# Patient Record
Sex: FEMALE | Race: WHITE | NOT HISPANIC OR LATINO | Employment: PART TIME | URBAN - METROPOLITAN AREA
[De-identification: names, ages, dates, MRNs, and addresses within clinical notes are randomized per-mention and may not be internally consistent; named-entity substitution may affect disease eponyms.]

---

## 2024-07-11 ENCOUNTER — OFFICE VISIT (OUTPATIENT)
Dept: OBSTETRICS AND GYNECOLOGY | Facility: CLINIC | Age: 29
End: 2024-07-11

## 2024-07-11 VITALS
DIASTOLIC BLOOD PRESSURE: 72 MMHG | SYSTOLIC BLOOD PRESSURE: 100 MMHG | WEIGHT: 105 LBS | BODY MASS INDEX: 18.61 KG/M2 | HEIGHT: 63 IN

## 2024-07-11 DIAGNOSIS — N76.0 ACUTE VAGINITIS: Primary | ICD-10-CM

## 2024-07-11 DIAGNOSIS — L73.1 INGROWN HAIR: ICD-10-CM

## 2024-07-11 PROCEDURE — 1036F TOBACCO NON-USER: CPT | Performed by: OBSTETRICS & GYNECOLOGY

## 2024-07-11 PROCEDURE — 87491 CHLMYD TRACH DNA AMP PROBE: CPT | Mod: WESLAB | Performed by: OBSTETRICS & GYNECOLOGY

## 2024-07-11 PROCEDURE — 87205 SMEAR GRAM STAIN: CPT | Mod: WESLAB | Performed by: OBSTETRICS & GYNECOLOGY

## 2024-07-11 PROCEDURE — 99203 OFFICE O/P NEW LOW 30 MIN: CPT | Performed by: OBSTETRICS & GYNECOLOGY

## 2024-07-11 PROCEDURE — 99213 OFFICE O/P EST LOW 20 MIN: CPT | Performed by: OBSTETRICS & GYNECOLOGY

## 2024-07-11 RX ORDER — HYDROCORTISONE 10 MG/ML
LOTION TOPICAL 2 TIMES DAILY
Qty: 96 G | Refills: 0 | Status: SHIPPED | OUTPATIENT
Start: 2024-07-11 | End: 2024-08-10

## 2024-07-11 RX ORDER — SUMATRIPTAN SUCCINATE 25 MG/1
25 TABLET ORAL ONCE AS NEEDED
COMMUNITY

## 2024-07-11 RX ORDER — AMITRIPTYLINE HYDROCHLORIDE 10 MG/1
TABLET, FILM COATED ORAL
COMMUNITY

## 2024-07-11 RX ORDER — METRONIDAZOLE 500 MG/1
500 TABLET ORAL 2 TIMES DAILY
Qty: 14 TABLET | Refills: 0 | Status: SHIPPED | OUTPATIENT
Start: 2024-07-11 | End: 2024-07-18

## 2024-07-11 ASSESSMENT — PATIENT HEALTH QUESTIONNAIRE - PHQ9
1. LITTLE INTEREST OR PLEASURE IN DOING THINGS: NOT AT ALL
SUM OF ALL RESPONSES TO PHQ9 QUESTIONS 1 AND 2: 1
2. FEELING DOWN, DEPRESSED OR HOPELESS: SEVERAL DAYS
10. IF YOU CHECKED OFF ANY PROBLEMS, HOW DIFFICULT HAVE THESE PROBLEMS MADE IT FOR YOU TO DO YOUR WORK, TAKE CARE OF THINGS AT HOME, OR GET ALONG WITH OTHER PEOPLE: SOMEWHAT DIFFICULT

## 2024-07-11 ASSESSMENT — ENCOUNTER SYMPTOMS
DEPRESSION: 0
LOSS OF SENSATION IN FEET: 0
OCCASIONAL FEELINGS OF UNSTEADINESS: 0

## 2024-07-11 ASSESSMENT — PAIN SCALES - GENERAL: PAINLEVEL: 0-NO PAIN

## 2024-07-11 NOTE — PROGRESS NOTES
"Cynthia Romeo is a 29 y.o.  who presents for vaginal discharge     Complains:       vaginal discharge             Foul smelling             itching and burning X 3 days.  Ingrowing hair pubis       Menses:   no periods Mirena        History of abnormal pap: No        OB History          0    Para   0    Term   0       0    AB   0    Living   0         SAB   0    IAB   0    Ectopic   0    Multiple   0    Live Births   0               Past Medical History:   Diagnosis Date    Migraine      History reviewed. No pertinent surgical history.  No family history on file.  Social History     Tobacco Use    Smoking status: Never    Smokeless tobacco: Never   Vaping Use    Vaping status: Every Day   Substance Use Topics    Alcohol use: Not Currently    Drug use: Never           REVIEW OF SYSTEMS  Abdomen: No abdominal pain, nausea, vomiting, diarrhea, or constipation.   No bloating, early satiety, indigestion, or increased flatulence.  Bladder: No dysuria, gross hematuria, urinary frequency, urinary urgency, or incontinence.  Breast: No breast lumps, nipple d/c, overlying skin changes, redness or skin retraction.  Allergies and current medication updated:Yes        EXAM:  /72   Ht 1.588 m (5' 2.5\")   Wt 47.6 kg (105 lb)   BMI 18.90 kg/m²   GENERAL: pleasant, female in no apparent distress  HEENT: Normocephalic, atraumatic, mucus membranes moist and no lesions  NECK: Supple, full range of motion, no adenopathy and thyroid normal  DERMATOLOGY: Normal, without lesions, non-icteric and non-hirsute     CHEST: Normal inspiratory effort  ABDOMEN: soft, non-tender and no masses  PELVIC:  irritated external genitalia, normal vaginal introitus and urethra, labia - unremarkable, cervix - white discharge . No lesions or CMT, adnexa - no masses or tenderness, uterus - nontender and normal size on palpable   Ingrowing hair      NEURO: alert and oriented x3,exam grossly non-focal  EXTREMITIES: normal      "   ASSESSMENT:  Vaginitis   Ingrowing hair      PLAN:  Swabs done   Discussed vulvar hygeine  Avoid soap and detergent  Cotton underwear  Routine Health Maintenance through patient PCP  Follow up one year and as needed.  .     Anne Montez MD           This note was produced with voice recognition software and may contain errors in grammar, spelling and content.  If any questions, please feel free to contact me.     I was accompanied by Female Chaperone, LPN  during the exam

## 2024-07-12 LAB
C TRACH RRNA SPEC QL NAA+PROBE: NEGATIVE
CLUE CELLS VAG LPF-#/AREA: ABNORMAL /[LPF]
N GONORRHOEA DNA SPEC QL PROBE+SIG AMP: NEGATIVE
NUGENT SCORE: 1
YEAST VAG WET PREP-#/AREA: PRESENT

## 2024-11-19 ENCOUNTER — APPOINTMENT (OUTPATIENT)
Dept: CARDIOLOGY | Facility: HOSPITAL | Age: 29
End: 2024-11-19
Payer: COMMERCIAL

## 2024-11-19 ENCOUNTER — HOSPITAL ENCOUNTER (EMERGENCY)
Facility: HOSPITAL | Age: 29
Discharge: HOME | End: 2024-11-19
Payer: COMMERCIAL

## 2024-11-19 VITALS
WEIGHT: 112 LBS | DIASTOLIC BLOOD PRESSURE: 90 MMHG | RESPIRATION RATE: 18 BRPM | HEIGHT: 62 IN | OXYGEN SATURATION: 100 % | BODY MASS INDEX: 20.61 KG/M2 | SYSTOLIC BLOOD PRESSURE: 114 MMHG | HEART RATE: 90 BPM | TEMPERATURE: 98.1 F

## 2024-11-19 DIAGNOSIS — F41.9 ANXIETY: Primary | ICD-10-CM

## 2024-11-19 LAB
ALBUMIN SERPL BCP-MCNC: 4.7 G/DL (ref 3.4–5)
ALP SERPL-CCNC: 70 U/L (ref 33–110)
ALT SERPL W P-5'-P-CCNC: 19 U/L (ref 7–45)
AMPHETAMINES UR QL SCN: NORMAL
ANION GAP SERPL CALCULATED.3IONS-SCNC: 11 MMOL/L (ref 10–20)
APAP SERPL-MCNC: <10 UG/ML
APPEARANCE UR: CLEAR
AST SERPL W P-5'-P-CCNC: 21 U/L (ref 9–39)
BARBITURATES UR QL SCN: NORMAL
BASOPHILS # BLD AUTO: 0.07 X10*3/UL (ref 0–0.1)
BASOPHILS NFR BLD AUTO: 0.9 %
BENZODIAZ UR QL SCN: NORMAL
BILIRUB SERPL-MCNC: 0.6 MG/DL (ref 0–1.2)
BILIRUB UR STRIP.AUTO-MCNC: NEGATIVE MG/DL
BUN SERPL-MCNC: 11 MG/DL (ref 6–23)
BZE UR QL SCN: NORMAL
CALCIUM SERPL-MCNC: 9.8 MG/DL (ref 8.6–10.3)
CANNABINOIDS UR QL SCN: NORMAL
CHLORIDE SERPL-SCNC: 106 MMOL/L (ref 98–107)
CO2 SERPL-SCNC: 23 MMOL/L (ref 21–32)
COLOR UR: COLORLESS
CREAT SERPL-MCNC: 0.82 MG/DL (ref 0.5–1.05)
EGFRCR SERPLBLD CKD-EPI 2021: >90 ML/MIN/1.73M*2
EOSINOPHIL # BLD AUTO: 0.05 X10*3/UL (ref 0–0.7)
EOSINOPHIL NFR BLD AUTO: 0.6 %
ERYTHROCYTE [DISTWIDTH] IN BLOOD BY AUTOMATED COUNT: 11.9 % (ref 11.5–14.5)
ETHANOL SERPL-MCNC: <10 MG/DL
FENTANYL+NORFENTANYL UR QL SCN: NORMAL
GLUCOSE SERPL-MCNC: 85 MG/DL (ref 74–99)
GLUCOSE UR STRIP.AUTO-MCNC: NORMAL MG/DL
HCT VFR BLD AUTO: 39.8 % (ref 36–46)
HGB BLD-MCNC: 13.5 G/DL (ref 12–16)
IMM GRANULOCYTES # BLD AUTO: 0.02 X10*3/UL (ref 0–0.7)
IMM GRANULOCYTES NFR BLD AUTO: 0.2 % (ref 0–0.9)
KETONES UR STRIP.AUTO-MCNC: NEGATIVE MG/DL
LEUKOCYTE ESTERASE UR QL STRIP.AUTO: NEGATIVE
LYMPHOCYTES # BLD AUTO: 1.51 X10*3/UL (ref 1.2–4.8)
LYMPHOCYTES NFR BLD AUTO: 18.5 %
MAGNESIUM SERPL-MCNC: 1.87 MG/DL (ref 1.6–2.4)
MCH RBC QN AUTO: 28.1 PG (ref 26–34)
MCHC RBC AUTO-ENTMCNC: 33.9 G/DL (ref 32–36)
MCV RBC AUTO: 83 FL (ref 80–100)
METHADONE UR QL SCN: NORMAL
MONOCYTES # BLD AUTO: 0.38 X10*3/UL (ref 0.1–1)
MONOCYTES NFR BLD AUTO: 4.7 %
NEUTROPHILS # BLD AUTO: 6.12 X10*3/UL (ref 1.2–7.7)
NEUTROPHILS NFR BLD AUTO: 75.1 %
NITRITE UR QL STRIP.AUTO: NEGATIVE
NRBC BLD-RTO: 0 /100 WBCS (ref 0–0)
OPIATES UR QL SCN: NORMAL
OXYCODONE+OXYMORPHONE UR QL SCN: NORMAL
PCP UR QL SCN: NORMAL
PH UR STRIP.AUTO: 7.5 [PH]
PLATELET # BLD AUTO: 226 X10*3/UL (ref 150–450)
POTASSIUM SERPL-SCNC: 3.3 MMOL/L (ref 3.5–5.3)
PROT SERPL-MCNC: 7.9 G/DL (ref 6.4–8.2)
PROT UR STRIP.AUTO-MCNC: NEGATIVE MG/DL
RBC # BLD AUTO: 4.8 X10*6/UL (ref 4–5.2)
RBC # UR STRIP.AUTO: NEGATIVE /UL
SALICYLATES SERPL-MCNC: <3 MG/DL
SODIUM SERPL-SCNC: 137 MMOL/L (ref 136–145)
SP GR UR STRIP.AUTO: 1
UROBILINOGEN UR STRIP.AUTO-MCNC: NORMAL MG/DL
WBC # BLD AUTO: 8.2 X10*3/UL (ref 4.4–11.3)

## 2024-11-19 PROCEDURE — 2500000004 HC RX 250 GENERAL PHARMACY W/ HCPCS (ALT 636 FOR OP/ED)

## 2024-11-19 PROCEDURE — 83735 ASSAY OF MAGNESIUM: CPT

## 2024-11-19 PROCEDURE — 93005 ELECTROCARDIOGRAM TRACING: CPT

## 2024-11-19 PROCEDURE — 81003 URINALYSIS AUTO W/O SCOPE: CPT

## 2024-11-19 PROCEDURE — 90839 PSYTX CRISIS INITIAL 60 MIN: CPT

## 2024-11-19 PROCEDURE — 99284 EMERGENCY DEPT VISIT MOD MDM: CPT | Mod: 25

## 2024-11-19 PROCEDURE — 80307 DRUG TEST PRSMV CHEM ANLYZR: CPT

## 2024-11-19 PROCEDURE — 96374 THER/PROPH/DIAG INJ IV PUSH: CPT

## 2024-11-19 PROCEDURE — 96375 TX/PRO/DX INJ NEW DRUG ADDON: CPT

## 2024-11-19 PROCEDURE — 80053 COMPREHEN METABOLIC PANEL: CPT

## 2024-11-19 PROCEDURE — 2500000001 HC RX 250 WO HCPCS SELF ADMINISTERED DRUGS (ALT 637 FOR MEDICARE OP)

## 2024-11-19 PROCEDURE — 36415 COLL VENOUS BLD VENIPUNCTURE: CPT

## 2024-11-19 PROCEDURE — 85025 COMPLETE CBC W/AUTO DIFF WBC: CPT

## 2024-11-19 PROCEDURE — 80320 DRUG SCREEN QUANTALCOHOLS: CPT

## 2024-11-19 RX ORDER — PROCHLORPERAZINE EDISYLATE 5 MG/ML
5 INJECTION INTRAMUSCULAR; INTRAVENOUS ONCE
Status: COMPLETED | OUTPATIENT
Start: 2024-11-19 | End: 2024-11-19

## 2024-11-19 RX ORDER — ACETAMINOPHEN 325 MG/1
975 TABLET ORAL ONCE
Status: COMPLETED | OUTPATIENT
Start: 2024-11-19 | End: 2024-11-19

## 2024-11-19 RX ORDER — DIPHENHYDRAMINE HYDROCHLORIDE 50 MG/ML
12.5 INJECTION INTRAMUSCULAR; INTRAVENOUS ONCE
Status: COMPLETED | OUTPATIENT
Start: 2024-11-19 | End: 2024-11-19

## 2024-11-19 RX ADMIN — PROCHLORPERAZINE EDISYLATE 5 MG: 5 INJECTION INTRAMUSCULAR; INTRAVENOUS at 16:37

## 2024-11-19 RX ADMIN — DIPHENHYDRAMINE HYDROCHLORIDE 12.5 MG: 50 INJECTION, SOLUTION INTRAMUSCULAR; INTRAVENOUS at 16:37

## 2024-11-19 RX ADMIN — ACETAMINOPHEN 975 MG: 325 TABLET, FILM COATED ORAL at 16:37

## 2024-11-19 SDOH — HEALTH STABILITY: MENTAL HEALTH: HAVE YOU EVER TRIED TO KILL YOURSELF?: NO

## 2024-11-19 SDOH — HEALTH STABILITY: MENTAL HEALTH: ANXIETY SYMPTOMS: GENERALIZED;PANIC ATTACK

## 2024-11-19 SDOH — HEALTH STABILITY: MENTAL HEALTH: IN THE PAST FEW WEEKS, HAVE YOU WISHED YOU WERE DEAD?: NO

## 2024-11-19 SDOH — HEALTH STABILITY: MENTAL HEALTH: WISH TO BE DEAD (PAST 1 MONTH): NO

## 2024-11-19 SDOH — HEALTH STABILITY: MENTAL HEALTH: NON-SPECIFIC ACTIVE SUICIDAL THOUGHTS (PAST 1 MONTH): NO

## 2024-11-19 SDOH — ECONOMIC STABILITY: HOUSING INSECURITY: FEELS SAFE LIVING IN HOME: YES

## 2024-11-19 SDOH — HEALTH STABILITY: MENTAL HEALTH: IN THE PAST FEW WEEKS, HAVE YOU FELT THAT YOU OR YOUR FAMILY WOULD BE BETTER OFF IF YOU WERE DEAD?: NO

## 2024-11-19 SDOH — HEALTH STABILITY: MENTAL HEALTH: SUICIDAL BEHAVIOR (LIFETIME): NO

## 2024-11-19 SDOH — HEALTH STABILITY: MENTAL HEALTH: IN THE PAST WEEK, HAVE YOU BEEN HAVING THOUGHTS ABOUT KILLING YOURSELF?: NO

## 2024-11-19 SDOH — HEALTH STABILITY: MENTAL HEALTH: DEPRESSION SYMPTOMS: NO PROBLEMS REPORTED OR OBSERVED.

## 2024-11-19 SDOH — HEALTH STABILITY: MENTAL HEALTH: ARE YOU HAVING THOUGHTS OF KILLING YOURSELF RIGHT NOW?: NO

## 2024-11-19 SDOH — ECONOMIC STABILITY: GENERAL: FINANCIAL CONCERNS: INSURANCE COVERAGE IS INADEQUATE

## 2024-11-19 ASSESSMENT — LIFESTYLE VARIABLES
PRESCIPTION_ABUSE_PAST_12_MONTHS: NO
SUBSTANCE_ABUSE_PAST_12_MONTHS: NO

## 2024-11-19 ASSESSMENT — PAIN DESCRIPTION - PROGRESSION: CLINICAL_PROGRESSION: NOT CHANGED

## 2024-11-19 ASSESSMENT — PAIN - FUNCTIONAL ASSESSMENT: PAIN_FUNCTIONAL_ASSESSMENT: 0-10

## 2024-11-19 ASSESSMENT — PAIN SCALES - GENERAL: PAINLEVEL_OUTOF10: 0 - NO PAIN

## 2024-11-19 NOTE — Clinical Note
Cynthia Romeo was seen and treated in our emergency department on 11/19/2024.  She may return to work on 11/20/2024.       If you have any questions or concerns, please don't hesitate to call.      Siobhan Joseph PA-C
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
## 2024-11-19 NOTE — ED PROVIDER NOTES
HPI   Chief Complaint   Patient presents with    Panic Attack     Pt bib EMS from side of the road for panic attack, pt pulled over and called EMS for transport. Pt stopped taking medications for anxiety. Vitals stable.       HPI  Patient is a 29-year-old female presenting brought in by EMS after having an alleged anxiety attack.  Patient states that she was driving and started hyperventilating and started having some numbness of her fingers and up her arms specifically with her left arm.  Patient states that this has been happening on and off for the past year and a half or so.  States her symptoms do improve when she tries to perform deep breathing exercises.  She states she did stop taking her anxiety medication because she felt it was making her dizzy.  She has not followed up with a psychiatrist or therapist regarding her symptoms.  She is frustrated and emotional providing history but denies suicidal or homicidal ideation.  Denies chest pain or shortness of breath.  States symptoms are improving upon arrival to the ER.      Patient History   Past Medical History:   Diagnosis Date    Migraine      History reviewed. No pertinent surgical history.  No family history on file.  Social History     Tobacco Use    Smoking status: Never    Smokeless tobacco: Never   Vaping Use    Vaping status: Every Day   Substance Use Topics    Alcohol use: Not Currently    Drug use: Never       Physical Exam   ED Triage Vitals [11/19/24 1548]   Temperature Heart Rate Respirations BP   36.7 °C (98.1 °F) 90 18 114/90      Pulse Ox Temp Source Heart Rate Source Patient Position   100 % Tympanic Monitor --      BP Location FiO2 (%)     Right arm --       Physical Exam  Vitals and nursing note reviewed.   Constitutional:       General: She is not in acute distress.     Appearance: She is well-developed.   HENT:      Head: Normocephalic and atraumatic.   Eyes:      Conjunctiva/sclera: Conjunctivae normal.   Cardiovascular:      Rate and  Rhythm: Normal rate and regular rhythm.      Heart sounds: No murmur heard.  Pulmonary:      Effort: Pulmonary effort is normal. No respiratory distress.      Breath sounds: Normal breath sounds.   Abdominal:      Palpations: Abdomen is soft.      Tenderness: There is no abdominal tenderness.   Musculoskeletal:         General: No swelling.      Cervical back: Neck supple.   Skin:     General: Skin is warm and dry.      Capillary Refill: Capillary refill takes less than 2 seconds.   Neurological:      Mental Status: She is alert.      Comments: Oriented to person place and time.  No focal neurologic deficits.  NIH stroke scale score is 0.   Psychiatric:         Mood and Affect: Mood normal.      Comments: Emotional but otherwise calm and cooperative with examiner           ED Course & MDM   ED Course as of 11/21/24 1633   Tue Nov 19, 2024   1647 ECG 12 lead  Performed at  1630, HR of 66, NSR, NAD, QTc 425, no sign of STEMI, no Q wave or T wave abnormality noted.    Reviewed and interpreted by me at time performed   [JM]   1743 She states after resting she feels much better and symptoms have completely resolved at this time.  Stable for discharge. [JJ]      ED Course User Index  [JJ] Siobhan Joseph PA-C  [] Missy Zuniga MD         Diagnoses as of 11/21/24 1633   Anxiety                 No data recorded     Kori Coma Scale Score: 15 (11/19/24 1553 : Amy Ackerman, SAM)                           Medical Decision Making  Parts of this chart have been completed using voice recognition software. Please excuse any errors of transcription.  My thought process and reason for plan has been formulated from the time that I saw the patient until the time of disposition and is not specific to one specific moment during their visit and furthermore my MDM encompasses this entire chart and not only this text box.      HPI: Detailed above.    Exam: A medically appropriate exam performed, outlined above, given the  known history and presentation.    History obtained from: Patient    Medications given during visit:  Medications   acetaminophen (Tylenol) tablet 975 mg (975 mg oral Given 11/19/24 1637)   prochlorperazine (Compazine) injection 5 mg (5 mg intravenous Given 11/19/24 1637)   diphenhydrAMINE (BENADryl) injection 12.5 mg (12.5 mg intravenous Given 11/19/24 1637)        Diagnostic/tests  Labs Reviewed   COMPREHENSIVE METABOLIC PANEL - Abnormal       Result Value    Glucose 85      Sodium 137      Potassium 3.3 (*)     Chloride 106      Bicarbonate 23      Anion Gap 11      Urea Nitrogen 11      Creatinine 0.82      eGFR >90      Calcium 9.8      Albumin 4.7      Alkaline Phosphatase 70      Total Protein 7.9      AST 21      Bilirubin, Total 0.6      ALT 19     URINALYSIS WITH REFLEX CULTURE AND MICROSCOPIC - Abnormal    Color, Urine Colorless (*)     Appearance, Urine Clear      Specific Gravity, Urine 1.005      pH, Urine 7.5      Protein, Urine NEGATIVE      Glucose, Urine Normal      Blood, Urine NEGATIVE      Ketones, Urine NEGATIVE      Bilirubin, Urine NEGATIVE      Urobilinogen, Urine Normal      Nitrite, Urine NEGATIVE      Leukocyte Esterase, Urine NEGATIVE     MAGNESIUM - Normal    Magnesium 1.87     ACUTE TOXICOLOGY PANEL, BLOOD - Normal    Acetaminophen <10.0      Salicylate  <3      Alcohol <10     DRUG SCREEN,URINE - Normal    Amphetamine Screen, Urine Presumptive Negative      Barbiturate Screen, Urine Presumptive Negative      Benzodiazepines Screen, Urine Presumptive Negative      Cannabinoid Screen, Urine Presumptive Negative      Cocaine Metabolite Screen, Urine Presumptive Negative      Fentanyl Screen, Urine Presumptive Negative      Opiate Screen, Urine Presumptive Negative      Oxycodone Screen, Urine Presumptive Negative      PCP Screen, Urine Presumptive Negative      Methadone Screen, Urine Presumptive Negative      Narrative:     Drug screen results are presumptive and should not be used to  assess   compliance with prescribed medication. Contact the performing [facility] laboratory   to add-on definitive confirmatory testing if clinically indicated.    Toxicology screening results are reported qualitatively. The concentration must   be greater than or equal to the cutoff to be reported as positive. The concentration   at which the screening test can detect an individual drug or metabolite varies.   The absence of expected drug(s) and/or drug metabolite(s) may indicate non-compliance,   inappropriate timing of specimen collection relative to drug administration, poor drug   absorption, diluted/adulterated urine, or limitations of testing. For medical purposes   only; not valid for forensic use.    Interpretive questions should be directed to the laboratory medical directors.   CBC WITH AUTO DIFFERENTIAL    WBC 8.2      nRBC 0.0      RBC 4.80      Hemoglobin 13.5      Hematocrit 39.8      MCV 83      MCH 28.1      MCHC 33.9      RDW 11.9      Platelets 226      Neutrophils % 75.1      Immature Granulocytes %, Automated 0.2      Lymphocytes % 18.5      Monocytes % 4.7      Eosinophils % 0.6      Basophils % 0.9      Neutrophils Absolute 6.12      Immature Granulocytes Absolute, Automated 0.02      Lymphocytes Absolute 1.51      Monocytes Absolute 0.38      Eosinophils Absolute 0.05      Basophils Absolute 0.07     URINALYSIS WITH REFLEX CULTURE AND MICROSCOPIC    Narrative:     The following orders were created for panel order Urinalysis with Reflex Culture and Microscopic.  Procedure                               Abnormality         Status                     ---------                               -----------         ------                     Urinalysis with Reflex C...[[id number]]  Abnormal            Final result               Extra Urine Gray Tube[[id number]]                                                         Please view results for these tests on the individual orders.   EXTRA URINE GRAY TUBE      No  orders to display        Considerations/further MDM:  Patient is a 29-year-old female presenting for evaluation of anxiety attack patient    The patient is somewhat emotional but otherwise in no apparent distress during the visit.  She is not suicidal and homicidal, do not feel that she poses a threat to herself or others at this time.  This patient presents with symptoms consistent with acute anxiety reaction / panic attack. Low suspicion for acute cardiopulmonary process including ACS, PE, or thoracic aortic dissection. Denies any ingestions or any other medical complaints. No evidence of alcohol withdrawal symptoms. Presentation not consistent with overt toxidrome, ingestion given history & physical. Presentation not consistent with organic or medical emergency at this time.Laboratory workup is unremarkable.  No evidence of drug or alcohol use on toxicology panel or urine drug screen.  Patient was endorsing a mild headache upon arrival and was provided Compazine, Benadryl and Tylenol with improvement of symptoms.  Crisis did evaluate the patient and provided outpatient resources.  They agree the patient does not require inpatient psychiatric hospitalization at this time as we feel the risks of hospitalization outweigh the benefits given patient history and presentation.  Patient is released in good condition accompanied by her mother.  Return precautions were discussed.      Procedure  Procedures     Siobhan Joseph PA-C  11/21/24 1387

## 2024-11-19 NOTE — DISCHARGE INSTRUCTIONS
Please utilize the resources provided to you by the  for further management of your anxiety and present back to ER with any new onset or worsening of symptoms despite therapy.    It is important to remember that your care does not end here and you must continue to monitor your condition closely. Please return to the emergency department for any worsening or concerning signs or symptoms as directed by our conversations and the discharge instructions. If you do not have a doctor please contact the referral number on your discharge instructions. Please contact any physician specialists provided in your discharge notes as it is very important to follow up with them regarding your condition. If you are unable to reach the physicians provided, please come back to the Emergency Department at any time.

## 2024-11-20 LAB
ATRIAL RATE: 66 BPM
P AXIS: 48 DEGREES
P OFFSET: 199 MS
P ONSET: 156 MS
PR INTERVAL: 130 MS
Q ONSET: 221 MS
QRS COUNT: 11 BEATS
QRS DURATION: 88 MS
QT INTERVAL: 406 MS
QTC CALCULATION(BAZETT): 425 MS
QTC FREDERICIA: 419 MS
R AXIS: 59 DEGREES
T AXIS: 39 DEGREES
T OFFSET: 424 MS
VENTRICULAR RATE: 66 BPM